# Patient Record
Sex: FEMALE | Race: WHITE
[De-identification: names, ages, dates, MRNs, and addresses within clinical notes are randomized per-mention and may not be internally consistent; named-entity substitution may affect disease eponyms.]

---

## 2018-06-02 ENCOUNTER — HOSPITAL ENCOUNTER (EMERGENCY)
Dept: HOSPITAL 80 - FED | Age: 38
Discharge: HOME | End: 2018-06-02
Payer: COMMERCIAL

## 2018-06-02 VITALS — SYSTOLIC BLOOD PRESSURE: 122 MMHG | DIASTOLIC BLOOD PRESSURE: 78 MMHG

## 2018-06-02 DIAGNOSIS — S01.312A: Primary | ICD-10-CM
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
## 2018-06-02 PROCEDURE — 09Q1XZZ REPAIR LEFT EXTERNAL EAR, EXTERNAL APPROACH: ICD-10-PCS

## 2018-06-02 NOTE — EDPHY
H & P


Stated Complaint: Works @[employer];scratched by one of their cats on L ear;

rabies UTD


Time Seen by Provider: 06/02/18 10:37


HPI/ROS: 


HPI:  This is a 38-year-old female who presents with





Chief Complaint: Works @[employer];scratched by one of their cats on L ear;

rabies UTD





Location:left ear lobe 


Quality: scratch 


Duration:  Prior to arrival


Signs and Symptoms:  + bleeding, no radiation, no numbness, no weakness, no 

tingling, no LOC, no decreased range of motion, no swelling, no pain, no fever


Timing:  Acute


Severity:  Mild


Context:  Patient has been working at the [employer] for the last 18 months 

presents with complaints of one of the cats scratching the left side of her 

cheek as well as her left earlobe.  Her Tetanus up-to-date. Cat was vaccinated 

and up-to-date on rabies. Denies LOC/head injury/neck pain/dizziness/nausea/

vomiting/amnesia. 


Modifying Factors:  None





Comment: 








ROS: see HPI


Constitutional: No fever, no chills, no weight loss


Eyes:  No blurred vision


Respiratory:  No shortness of breath, no cough


Cardiovascular:  No chest pain


Gastrointestinal:  No nausea, no vomiting no diarrhea 


Genitourinary:  No dysuria 


Extremities:  No myalgias 


Neurologic:  No weakness, no numbness


Skin:  No rashes


Hematologic:  No bruising, no bleeding








MEDICAL/SURGICAL/SOCIAL HISTORY: 


Medical history:  Hypothyroidism, depression


Surgical history:  Denies


Social history:  Nonsmoker. 








CONSTITUTIONAL:  Extremely polite and cooperative adult female, awake and alert

, no obvious distress


HEENT: Atraumatic and normocephalic, PERRL, EOMI.  Nares patent; no rhinorrhea;

  no nasal mucosal edema.  Left external ear shows a v-shaped superficial 

laceration-no active bleeding. Tympanic membranes clear. Oropharynx clear, no 

exudate and moist pink mucosa.  Airway patent.  No lymphadenopathy.  No 

meningismus.


Cardiovascular: Normal S1/S2, regular rate, regular rhythm, without murmur rub 

or gallop.


PULMONARY/CHEST:  Symmetrical and nontender. Clear to auscultation bilaterally. 

Good air movement. No accessory muscle usage.


ABDOMEN:  Soft, nondistended, nontender, no rebound, no guarding, no peritoneal 

signs, no masses or organomegaly. No CVAT.


EXTREMITIES:  2/2 pulses, strength 5/5, no deformities, no clubbing, no 

cyanosis or edema.


NEUROLOGICAL: no focal neuro deficits.  GCS 15.


SKIN: Warm and dry, superficial abrasion noted to left temporal area; no 

erythema. no rash.  Good capillary refill. 


  


Source: Patient


Exam Limitations: No limitations





- Personal History


LMP (Females 10-55): 8-14 Days Ago


Current Tetanus Diphtheria and Acellular Pertussis (TDAP): Yes





- Medical/Surgical History


Other PMH: healthy





- Social History


Smoking Status: Never smoked


Constitutional: 


 Initial Vital Signs











Temperature (C)  36.7 C   06/02/18 09:50


 


Heart Rate  76   06/02/18 09:50


 


Respiratory Rate  16   06/02/18 09:50


 


Blood Pressure  108/72   06/02/18 09:50


 


O2 Sat (%)  100   06/02/18 09:50








 











O2 Delivery Mode               Room Air














Allergies/Adverse Reactions: 


 





Penicillins Allergy (Severe, Verified 06/02/18 09:58)


 Anaphylaxis








Home Medications: 














 Medication  Instructions  Recorded


 


Citalopram Hydrobromide [celeXA 10 10 mg PO DAILY 06/02/18





MG]  


 


Doxycycline Hyclate 100 mg PO BID #14 tab 06/02/18


 


Levothyroxine [Synthroid 88 mcg 88 mcg PO DAILY06 06/02/18





(*)]  














Medical Decision Making


Procedures: 


Procedure:  Laceration repair.





Verbal consent was obtained from the patient.  The 1/8 inch V shaped, simple, 

superficial laceration on the left ear lobe was anesthetized in the usual 

fashion using 2 mL of 1% lidocaine with epinephrine.  The wound was irrigated, 

draped and explored to its base with a gloved finger.  There were no deep 

structures involved.  No tendon injury was identified.  The wound was loosely 

repaired with #2, 6-0 Vicryl.  The procedure was performed by myself.





ED Course/Re-evaluation: 


Patient has a penicillin allergy; given prescription for doxycycline


Tetanus is up-to-date


Rabies immunoglobulin not indicated


Abrasions cleaned and irrigated copiously


Loose closure provided with absorbable sutures x 2 to obtain the best cosmesis 

for the patient


No signs of neurovascular compromise/tenting of skin/compartment syndrome/

extremities and joints examined above and below area of concern and are 

neurovascularly intact. 








This patient was seen under the supervision of my secondary supervising 

physician.  I evaluated care for this patient independently.  Discussed this 

patient with Dr. Ospina who did not see the patient.  





Differential Diagnosis: 


Differential diagnosis includes but is not limited to abrasion, laceration,, 

contusion, cellulitis.








Departure





- Departure


Disposition: Home, Routine, Self-Care


Clinical Impression: 


Cat scratch of face


Qualifiers:


 Encounter type: initial encounter Qualified Code(s): S00.81XA - Abrasion of 

other part of head, initial encounter; W55.03XA - Scratched by cat, initial 

encounter; W55.03XA - Scratched by cat, initial encounter





Laceration of ear lobe


Qualifiers:


 Encounter type: initial encounter Laterality: left Qualified Code(s): S01.312A 

- Laceration without foreign body of left ear, initial encounter





Condition: Good


Instructions:  Cat Scratch Disease (ED)


Additional Instructions: 


Keep the dressing dry and in place for 48 hours.


After 48 hours, you may remove the dressing; wash the site daily with mild soap 

and water; then pat dry. 


Take Tylenol 650 mg every 4 hours and/or Ibuprofen 600 mg every 8 hours with 

food as needed for pain. 


Take doxycycline twice daily x7 days. 


An absorbable suture was used to loosely close your laceration to your earlobe.

  This will slowly dissolve over time. 





Return to the ER immediately if you experience redness, red streaks, have fevers

/chills, flu like symptoms, limited range of motion, or any other symptoms that 

concern you.

















Referrals: 


Jenny Santiago MD [Primary Care Provider] - Follow Up Only If Needed


Prescriptions: 


Doxycycline Hyclate 100 mg PO BID #14 tab

## 2019-02-18 ENCOUNTER — HOSPITAL ENCOUNTER (OUTPATIENT)
Dept: HOSPITAL 80 - FIMAGING | Age: 39
End: 2019-02-18
Attending: FAMILY MEDICINE
Payer: COMMERCIAL

## 2019-02-18 DIAGNOSIS — Z80.3: ICD-10-CM

## 2019-02-18 DIAGNOSIS — Z12.31: Primary | ICD-10-CM
